# Patient Record
Sex: MALE
[De-identification: names, ages, dates, MRNs, and addresses within clinical notes are randomized per-mention and may not be internally consistent; named-entity substitution may affect disease eponyms.]

---

## 2018-04-09 ENCOUNTER — HOSPITAL ENCOUNTER (EMERGENCY)
Dept: HOSPITAL 14 - H.ER | Age: 73
Discharge: HOME | End: 2018-04-09
Payer: MEDICARE

## 2018-04-09 VITALS
OXYGEN SATURATION: 99 % | HEART RATE: 104 BPM | DIASTOLIC BLOOD PRESSURE: 80 MMHG | TEMPERATURE: 97 F | RESPIRATION RATE: 18 BRPM | SYSTOLIC BLOOD PRESSURE: 170 MMHG

## 2018-04-09 DIAGNOSIS — I10: ICD-10-CM

## 2018-04-09 DIAGNOSIS — E78.00: ICD-10-CM

## 2018-04-09 DIAGNOSIS — E11.9: ICD-10-CM

## 2018-04-09 DIAGNOSIS — K59.00: Primary | ICD-10-CM

## 2018-04-09 NOTE — ED PDOC
HPI: General Adult


Time Seen by Provider: 04/09/18 10:33


Chief Complaint (Nursing): GI Problem


History Per: Patient


Onset/Duration Of Symptoms: Days (4)


Current Symptoms Are (Timing): Still Present


Severity: Mild


Additional Complaint(s): 





Constipated x 4 days. Passing gas. No abd pain, no vomiting. No fever.





Past Medical History


Vital Signs: 


 Last Vital Signs











Temp  97 F L  04/09/18 10:05


 


Pulse  104 H  04/09/18 10:05


 


Resp  18   04/09/18 10:05


 


BP  170/80 H  04/09/18 10:05


 


Pulse Ox  99   04/09/18 10:41














- Medical History


PMH: Diabetes, HTN, Hypercholesterolemia





- Family History


Family History: States: Unknown Family Hx





- Home Medications


Home Medications: 


 Ambulatory Orders











 Medication  Instructions  Recorded


 


Polyethylene Glycol 3350 [Miralax] 17 gm PO DAILY #10 dose 04/09/18














- Allergies


Allergies/Adverse Reactions: 


 Allergies











Allergy/AdvReac Type Severity Reaction Status Date / Time


 


No Known Allergies Allergy   Verified 04/09/18 10:05














Review of Systems


Constitutional: Negative for: Fever


Gastrointestinal: Positive for: Constipation.  Negative for: Vomiting, 

Abdominal Pain





Physical Exam





- Physical Exam


Appears: Positive for: Non-toxic, No Acute Distress


Skin: Positive for: Normal Color, Warm, DRY


Gastrointestinal/Abdominal: Positive for: Bowel Sounds, Soft.  Negative for: 

Tenderness, Distended


Rectal: Positive for: Other (No stool in vault. No masses)





- ECG


O2 Sat by Pulse Oximetry: 99





- Progress


Re-evaluation Time: 13:11


Condition: Improved (Had BM)





Disposition





- Clinical Impression


Clinical Impression: 


 Constipation








- Patient ED Disposition


Is Patient to be Admitted: No


Counseled Patient/Family Regarding: Diagnosis, Need For Followup, Rx Given





- Disposition


Referrals: 


Travis Samuel MD [Family Provider] - 


Disposition: Routine/Home


Disposition Time: 13:12


Condition: FAIR


Prescriptions: 


Polyethylene Glycol 3350 [Miralax] 17 gm PO DAILY #10 dose


Instructions:  Constipation in Adults


Forms:  [facility] (English)


Print Language: Hungarian

## 2018-04-09 NOTE — RAD
HISTORY:

r/o obstruction  



COMPARISON:

No prior.



FINDINGS:



BOWEL:

Constipation without fecal impaction or obstruction.  



BONES:

Normal.



OTHER FINDINGS:

None.



IMPRESSION:

No evidence of mechanical obstruction, free air or other pathologic 

process.